# Patient Record
Sex: MALE | Race: WHITE | Employment: FULL TIME | URBAN - METROPOLITAN AREA
[De-identification: names, ages, dates, MRNs, and addresses within clinical notes are randomized per-mention and may not be internally consistent; named-entity substitution may affect disease eponyms.]

---

## 2019-12-12 ENCOUNTER — TELEPHONE (OUTPATIENT)
Dept: FAMILY MEDICINE CLINIC | Age: 59
End: 2019-12-12

## 2019-12-12 NOTE — TELEPHONE ENCOUNTER
Mrs. Lalo Hernandez called in reference to her . She is concerned, he has a knot in groin area. It is not causing pain, however it is unusual and she wants to make sure it isn't cancerous. She is requesting to be fit in with Dr. Michele Dykes today. I informed her that schedule was full. I would ask Dr. Michele Dykes between patients. Please review and advise. Last seen 8/4/15, will be considered a new patient.  [phone]